# Patient Record
Sex: FEMALE
[De-identification: names, ages, dates, MRNs, and addresses within clinical notes are randomized per-mention and may not be internally consistent; named-entity substitution may affect disease eponyms.]

---

## 2019-12-17 ENCOUNTER — RESULT REVIEW (OUTPATIENT)
Age: 54
End: 2019-12-17

## 2020-09-22 DIAGNOSIS — Z01.818 ENCOUNTER FOR OTHER PREPROCEDURAL EXAMINATION: ICD-10-CM

## 2020-09-22 PROBLEM — Z00.00 ENCOUNTER FOR PREVENTIVE HEALTH EXAMINATION: Status: ACTIVE | Noted: 2020-09-22

## 2020-09-23 ENCOUNTER — APPOINTMENT (OUTPATIENT)
Dept: DISASTER EMERGENCY | Facility: CLINIC | Age: 55
End: 2020-09-23

## 2020-09-24 LAB — SARS-COV-2 N GENE NPH QL NAA+PROBE: NOT DETECTED

## 2020-10-05 ENCOUNTER — APPOINTMENT (OUTPATIENT)
Dept: DISASTER EMERGENCY | Facility: CLINIC | Age: 55
End: 2020-10-05

## 2020-10-06 LAB — SARS-COV-2 N GENE NPH QL NAA+PROBE: NOT DETECTED

## 2022-06-13 ENCOUNTER — RESULT REVIEW (OUTPATIENT)
Age: 57
End: 2022-06-13

## 2022-12-14 ENCOUNTER — RX ONLY (RX ONLY)
Age: 57
End: 2022-12-14

## 2022-12-14 ENCOUNTER — OFFICE (OUTPATIENT)
Dept: URBAN - METROPOLITAN AREA CLINIC 93 | Facility: CLINIC | Age: 57
Setting detail: OPHTHALMOLOGY
End: 2022-12-14
Payer: COMMERCIAL

## 2022-12-14 VITALS — WEIGHT: 167 LBS | HEIGHT: 63 IN | BODY MASS INDEX: 29.59 KG/M2

## 2022-12-14 DIAGNOSIS — H11.823: ICD-10-CM

## 2022-12-14 DIAGNOSIS — H16.223: ICD-10-CM

## 2022-12-14 DIAGNOSIS — D31.01: ICD-10-CM

## 2022-12-14 DIAGNOSIS — H02.89: ICD-10-CM

## 2022-12-14 PROCEDURE — 92285 EXTERNAL OCULAR PHOTOGRAPHY: CPT | Performed by: OPHTHALMOLOGY

## 2022-12-14 PROCEDURE — 92002 INTRM OPH EXAM NEW PATIENT: CPT | Performed by: OPHTHALMOLOGY

## 2022-12-14 ASSESSMENT — KERATOMETRY
OS_K1POWER_DIOPTERS: 43.00
OD_K2POWER_DIOPTERS: 43.25
OD_K1POWER_DIOPTERS: 43.75
OD_AXISANGLE_DEGREES: 132
OS_K2POWER_DIOPTERS: 43.75
OS_AXISANGLE_DEGREES: 133

## 2022-12-14 ASSESSMENT — CONFRONTATIONAL VISUAL FIELD TEST (CVF)
OD_FINDINGS: FULL
OS_FINDINGS: FULL

## 2022-12-14 ASSESSMENT — SPHEQUIV_DERIVED
OD_SPHEQUIV: 0.75
OS_SPHEQUIV: 0.625

## 2022-12-14 ASSESSMENT — REFRACTION_CURRENTRX
OD_CYLINDER: SPH
OS_OVR_VA: 20/
OS_CYLINDER: SPH
OS_SPHERE: +1.75
OD_SPHERE: +1.75
OD_OVR_VA: 20/

## 2022-12-14 ASSESSMENT — REFRACTION_AUTOREFRACTION
OD_CYLINDER: -1.00
OS_SPHERE: +1.00
OS_CYLINDER: -0.75
OD_AXIS: 086
OD_SPHERE: +1.25
OS_AXIS: 089

## 2022-12-14 ASSESSMENT — VISUAL ACUITY
OD_BCVA: 20/50
OS_BCVA: 20/40-1

## 2022-12-14 ASSESSMENT — AXIALLENGTH_DERIVED
OS_AL: 23.396
OD_AL: 23.3033

## 2022-12-14 ASSESSMENT — LID EXAM ASSESSMENTS
OS_MEIBOMITIS: LLL 1+
OD_MEIBOMITIS: RLL 2+

## 2022-12-14 ASSESSMENT — TEAR BREAK UP TIME (TBUT)
OD_TBUT: 8 SECONDS
OS_TBUT: 8 SECONDS

## 2022-12-16 ENCOUNTER — APPOINTMENT (OUTPATIENT)
Dept: ORTHOPEDIC SURGERY | Facility: CLINIC | Age: 57
End: 2022-12-16

## 2023-01-20 ENCOUNTER — APPOINTMENT (OUTPATIENT)
Dept: ORTHOPEDIC SURGERY | Facility: CLINIC | Age: 58
End: 2023-01-20
Payer: COMMERCIAL

## 2023-01-20 VITALS — HEIGHT: 63 IN | WEIGHT: 167 LBS | BODY MASS INDEX: 29.59 KG/M2

## 2023-01-20 DIAGNOSIS — I10 ESSENTIAL (PRIMARY) HYPERTENSION: ICD-10-CM

## 2023-01-20 DIAGNOSIS — Z78.9 OTHER SPECIFIED HEALTH STATUS: ICD-10-CM

## 2023-01-20 PROCEDURE — 72110 X-RAY EXAM L-2 SPINE 4/>VWS: CPT

## 2023-01-20 PROCEDURE — 99214 OFFICE O/P EST MOD 30 MIN: CPT

## 2023-01-20 PROCEDURE — 72170 X-RAY EXAM OF PELVIS: CPT

## 2023-01-20 RX ORDER — NAPROXEN 500 MG/1
500 TABLET ORAL
Qty: 60 | Refills: 1 | Status: ACTIVE | COMMUNITY
Start: 2023-01-20 | End: 1900-01-01

## 2023-01-20 NOTE — HISTORY OF PRESENT ILLNESS
[7] : 7 [Dull/Aching] : dull/aching [Radiating] : radiating [Constant] : constant [Household chores] : household chores [Work] : work [Sleep] : sleep [Nothing helps with pain getting better] : Nothing helps with pain getting better [de-identified] : 57 F with acute on chronic low back pain. Had history of low back pain treated with JUAN last in 2020 with good life.  Had recurrence of pain in 8/22.  Has been taking naprosyn,  muscle relaxant,with minimal relief. Was being prescribed by primary care.  Previous MRI with history of spinal stneosis.  No recent PT.  Pain radiates from low back to feet.  Having difficulty standing up straight. When she walks her legs get tired and then when she sits down it improves.  \par \par \par Works as a CNA.  [] : no [FreeTextEntry5] : VERN ADAN is a 57 year old female presenting with low back pain. Pain started years ago. Radiates down b/l legs. Difficulty walking because of pain. Had epidural in 2020- helped until now.  [FreeTextEntry7] : b/l legs

## 2023-01-20 NOTE — IMAGING
[de-identified] : Spine:\par Inspection/Palpation:\par No tenderness to palpation throughout Cervical/thoracic/lumbar spine.\par No bony stepoffs, No lesions.\par \par Gait:\par Non-antalgic, able to perform bilateral toe and heel rise.  Able to perform tandem gait.  \par \par Range of Motion:\par Lumbar Spine: Flexion to 90 degrees, Extension to 30 degrees, rotation 30 degrees bilaterally, lateral flexion to 30 degrees bilaterally.\par \par Neurologic:\par \par Bilateral Lower Extremities 5/5 Iliopsoas/Quadriceps/Hamstrings/ Tibialis Anterior/ Gastrocnemius. Extensor Hallucis Longus/ Flexor Hallucis Longus except \par \par \par Sensation intact to light touch L2-S1\par \par Patellar/ Achilles reflex within normal limits.\par \par \par Negative straight leg raise\par \par No pain with IR/ER/Flexion of HIps bilaterally \par \par X-ray Ap/Lateral/Flexion/Extension of lumbar spine from today were viewed and interpreted.  Normal alignment is maintained without any spondylolisthesis. mild coronal curvature.  Diffuse Spondylosis.  L3-4 spondylolisthesis \par x-ray ap pelvis. No fracutres.  No hip OA \par \par MRI L spien from [facility] 2020 reviewed. \par 1. Exaggerated mid lumbar lordosis and grade 1 anterolisthesis at L3-L4 with multilevel degenerative disc dise\par multiple disc herniations.\par 2. Moderate central stenosis with left greater than right exiting L3 nerve root impingement at L3-L4, mild cent\par with right exiting L4 nerve root impingement at L4-L5 and mild central stenosis with bilateral exiting L5 nerve \par impingement at L5-S1 with degenerative endplate changes in the mid to lower lumbar spine without acute frac\par defect.

## 2023-01-25 ENCOUNTER — FORM ENCOUNTER (OUTPATIENT)
Age: 58
End: 2023-01-25

## 2023-01-26 ENCOUNTER — APPOINTMENT (OUTPATIENT)
Dept: MRI IMAGING | Facility: CLINIC | Age: 58
End: 2023-01-26
Payer: COMMERCIAL

## 2023-01-26 PROCEDURE — 72148 MRI LUMBAR SPINE W/O DYE: CPT

## 2023-01-27 ENCOUNTER — OFFICE (OUTPATIENT)
Dept: URBAN - METROPOLITAN AREA CLINIC 93 | Facility: CLINIC | Age: 58
Setting detail: OPHTHALMOLOGY
End: 2023-01-27
Payer: COMMERCIAL

## 2023-01-27 DIAGNOSIS — H02.89: ICD-10-CM

## 2023-01-27 DIAGNOSIS — D31.01: ICD-10-CM

## 2023-01-27 DIAGNOSIS — H11.823: ICD-10-CM

## 2023-01-27 DIAGNOSIS — H16.223: ICD-10-CM

## 2023-01-27 PROCEDURE — 92012 INTRM OPH EXAM EST PATIENT: CPT | Performed by: OPHTHALMOLOGY

## 2023-01-27 ASSESSMENT — REFRACTION_AUTOREFRACTION
OD_CYLINDER: -1.00
OD_AXIS: 080
OS_CYLINDER: -1.25
OS_AXIS: 096
OD_SPHERE: +1.00
OS_SPHERE: +1.25

## 2023-01-27 ASSESSMENT — KERATOMETRY
OD_AXISANGLE_DEGREES: 063
OD_K2POWER_DIOPTERS: 44.00
OS_K1POWER_DIOPTERS: 43.25
OD_K1POWER_DIOPTERS: 43.25
OS_AXISANGLE_DEGREES: 118
OS_K2POWER_DIOPTERS: 44.25

## 2023-01-27 ASSESSMENT — REFRACTION_CURRENTRX
OD_OVR_VA: 20/
OS_SPHERE: +1.75
OS_CYLINDER: SPH
OD_CYLINDER: SPH
OS_OVR_VA: 20/
OD_SPHERE: +1.75

## 2023-01-27 ASSESSMENT — VISUAL ACUITY
OD_BCVA: 20/25-2
OS_BCVA: 20/40-2

## 2023-01-27 ASSESSMENT — LID EXAM ASSESSMENTS
OD_MEIBOMITIS: RLL 2+
OS_MEIBOMITIS: LLL 1+

## 2023-01-27 ASSESSMENT — TEAR BREAK UP TIME (TBUT)
OS_TBUT: 8 SECONDS
OD_TBUT: 8 SECONDS

## 2023-01-27 ASSESSMENT — SPHEQUIV_DERIVED
OS_SPHEQUIV: 0.625
OD_SPHEQUIV: 0.5

## 2023-01-27 ASSESSMENT — AXIALLENGTH_DERIVED
OS_AL: 23.2613
OD_AL: 23.3536

## 2023-01-27 ASSESSMENT — CONFRONTATIONAL VISUAL FIELD TEST (CVF)
OD_FINDINGS: FULL
OS_FINDINGS: FULL

## 2023-02-03 ENCOUNTER — APPOINTMENT (OUTPATIENT)
Dept: ORTHOPEDIC SURGERY | Facility: CLINIC | Age: 58
End: 2023-02-03
Payer: COMMERCIAL

## 2023-02-03 VITALS — HEIGHT: 63 IN | WEIGHT: 167 LBS | BODY MASS INDEX: 29.59 KG/M2

## 2023-02-03 DIAGNOSIS — M54.42 LUMBAGO WITH SCIATICA, RIGHT SIDE: ICD-10-CM

## 2023-02-03 DIAGNOSIS — M48.062 SPINAL STENOSIS, LUMBAR REGION WITH NEUROGENIC CLAUDICATION: ICD-10-CM

## 2023-02-03 DIAGNOSIS — G89.29 LUMBAGO WITH SCIATICA, RIGHT SIDE: ICD-10-CM

## 2023-02-03 DIAGNOSIS — M54.41 LUMBAGO WITH SCIATICA, RIGHT SIDE: ICD-10-CM

## 2023-02-03 PROCEDURE — 99213 OFFICE O/P EST LOW 20 MIN: CPT

## 2023-02-03 NOTE — HISTORY OF PRESENT ILLNESS
[Lower back] : lower back [7] : 7 [Dull/Aching] : dull/aching [Radiating] : radiating [Constant] : constant [Household chores] : household chores [Work] : work [Sleep] : sleep [Nothing helps with pain getting better] : Nothing helps with pain getting better [Full time] : Work status: full time [de-identified] : 57 F with acute on chronic low back pain. Had history of low back pain treated with JUAN last in 2020 with good life.  Had recurrence of pain in 8/22.  Has been taking naprosyn,  muscle relaxant,with minimal relief. Was being prescribed by primary care.  Previous MRI with history of spinal stneosis.  No recent PT.  Pain radiates from low back to feet.  Having difficulty standing up straight. When she walks her legs get tired and then when she sits down it improves.  \par \par Works as a CNA. \par \par 2/3/23: pt is here for MRI results today. states no significant change since last visit. Pain is same.  [] : no [FreeTextEntry5] : Pt is here for MRI review of L spine.  [FreeTextEntry7] : b/l legs [de-identified] : MRI [de-identified] : PT [de-identified] : CNA

## 2023-02-03 NOTE — IMAGING
[de-identified] : Spine:\par Inspection/Palpation:\par No tenderness to palpation throughout Cervical/thoracic/lumbar spine.\par No bony stepoffs, No lesions.\par \par Gait:\par Non-antalgic, able to perform bilateral toe and heel rise.  Able to perform tandem gait.  \par \par Range of Motion:\par Lumbar Spine: Flexion to 90 degrees, Extension to 30 degrees, rotation 30 degrees bilaterally, lateral flexion to 30 degrees bilaterally.\par \par Neurologic:\par \par Bilateral Lower Extremities 5/5 Iliopsoas/Quadriceps/Hamstrings/ Tibialis Anterior/ Gastrocnemius. Extensor Hallucis Longus/ Flexor Hallucis Longus except \par \par \par Sensation intact to light touch L2-S1\par \par Patellar/ Achilles reflex within normal limits.\par \par \par Negative straight leg raise\par \par No pain with IR/ER/Flexion of HIps bilaterally \par \par X-ray Ap/Lateral/Flexion/Extension of lumbar spine from today were viewed and interpreted.  Normal alignment is maintained without any spondylolisthesis. mild coronal curvature.  Diffuse Spondylosis.  L3-4 spondylolisthesis \par x-ray ap pelvis. No fracutres.  No hip OA \par \par MRI L spien from [facility] 2020 reviewed. \par 1. Exaggerated mid lumbar lordosis and grade 1 anterolisthesis at L3-L4 with multilevel degenerative disc dise\par multiple disc herniations.\par 2. Moderate central stenosis with left greater than right exiting L3 nerve root impingement at L3-L4, mild cent\par with right exiting L4 nerve root impingement at L4-L5 and mild central stenosis with bilateral exiting L5 nerve \par impingement at L5-S1 with degenerative endplate changes in the mid to lower lumbar spine without acute frac\par defect.\par \par \par MRI L spine from [facility] 2023 reviewed.  \par 1. Straightening of the lordosis with multilevel loss of disc signal and height most noted at L4-L5 and L5-S1\par with anterior spurring and bulging with Modic type 2 endplate changes.\par 2. T12-L1: Facet hypertrophy and ligamentum flavum hypertrophy.\par 3. L1-L2: Facet hypertrophy and ligamentum flavum hypertrophy.\par 4. L2-L3: Minor retrolisthesis. Bulge, facet hypertrophy, and ligamentum flavum hypertrophy with inferior\par foraminal stenosis. Bulge and superimposed herniation more noted asymmetric to left of midline impressing on \par the thecal sac with moderate central stenosis. This is progressed from the prior study.\par 5. L3-L4: Grade 1 anterior spondylolisthesis. Broad bulge, facet arthrosis, and ligamentum flavum hypertrophy\par with inferior foraminal stenosis. Bulge and superimposed broad herniation impressing on the thecal sac with\par moderate central stenosis. This is progressed from the prior study.\par 6. L4-L5: Broad bulge, spondylotic ridge, and facet arthrosis with foraminal stenosis right greater than left.\par Broad bulge and superimposed herniation impressing on the thecal sac with mild-to-moderate central stenosis. \par This is not significantly changed from the prior study, although the herniation is better visualized.\par 7. L5-S1: Minor retrolisthesis. Broad bulge, spondylotic ridge, facet arthrosis, and ligamentum flavum \par hypertrophy with foraminal stenosis. Broad herniation contacting the S1 nerve roots with borderline central \par stenosis. This is slightly progressed from the prior study.\par 8. Left renal lesions, likely cysts. Ultrasound is suggested for further evaluation.

## 2023-02-03 NOTE — ASSESSMENT
[FreeTextEntry1] : 57F with low back pain ,radiculopathy and neurogenic claudication. \par FU with PMD regarding renal cyst\par FU with pain management Roque Lazaro re: JUAN

## 2023-02-06 ENCOUNTER — APPOINTMENT (OUTPATIENT)
Dept: PAIN MANAGEMENT | Facility: CLINIC | Age: 58
End: 2023-02-06

## 2023-02-13 ENCOUNTER — APPOINTMENT (OUTPATIENT)
Dept: PAIN MANAGEMENT | Facility: CLINIC | Age: 58
End: 2023-02-13

## 2023-04-20 NOTE — ASSESSMENT
First balloon inflation max pressure = 12 tony. First balloon inflation duration = 5 seconds. Second inflation of balloon - Max pressure = 12 tony. 2nd Inflation of balloon - Duration = 5 seconds. [FreeTextEntry1] : 57F with low back pain ,r adiculopathy and neurogenic claudication. \par Faield NSAIDS and muscle relaxants for last 6 months directed by HER PMD \par Naprosyn\par PT\par MRI L Spine\par FU after MRI for likely JUAN